# Patient Record
Sex: FEMALE | Race: WHITE | NOT HISPANIC OR LATINO | Employment: UNEMPLOYED | URBAN - NONMETROPOLITAN AREA
[De-identification: names, ages, dates, MRNs, and addresses within clinical notes are randomized per-mention and may not be internally consistent; named-entity substitution may affect disease eponyms.]

---
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
## 2024-04-02 ENCOUNTER — APPOINTMENT (OUTPATIENT)
Dept: ULTRASOUND IMAGING | Facility: HOSPITAL | Age: 19
End: 2024-04-02
Payer: COMMERCIAL

## 2024-04-02 ENCOUNTER — HOSPITAL ENCOUNTER (EMERGENCY)
Facility: HOSPITAL | Age: 19
Discharge: HOME | End: 2024-04-02
Attending: EMERGENCY MEDICINE | Admitting: EMERGENCY MEDICINE
Payer: COMMERCIAL

## 2024-04-02 VITALS
SYSTOLIC BLOOD PRESSURE: 116 MMHG | BODY MASS INDEX: 29.35 KG/M2 | HEART RATE: 88 BPM | TEMPERATURE: 98.2 F | OXYGEN SATURATION: 95 % | HEIGHT: 70 IN | WEIGHT: 205 LBS | RESPIRATION RATE: 16 BRPM | DIASTOLIC BLOOD PRESSURE: 77 MMHG

## 2024-04-02 DIAGNOSIS — M25.562 POSTERIOR LEFT KNEE PAIN: Primary | ICD-10-CM

## 2024-04-02 PROCEDURE — 99284 EMERGENCY DEPT VISIT MOD MDM: CPT

## 2024-04-02 PROCEDURE — 93971 EXTREMITY STUDY: CPT

## 2024-04-02 NOTE — ED PROVIDER NOTES
"Subjective  History of Present Illness:    Chief Complaint:   Chief Complaint   Patient presents with    Knee Pain      History of Present Illness: Moise Malone is a 18 y.o. female who presents to the emergency department complaining of left popliteal pain.  Patient states for 2 years she has been seen several times for this.  She had several plain films which were all normal per her report, she states she believes had an MRI done in the past year that showed a Baker's cyst but she is unclear for sure if it was a CT scan or an MRI.  She has been referred to orthopedics but due to insurance issues has not been able to be seen.  She states the pain in the left popliteal area is worsening feels as though her knee gets \"stuck\" and \"locks\" when her knee is flexed and she has to use her hands to straighten her leg out and it is painful when her knee is locked.  No calf pain or tenderness.  No history of DVT or PE.  Not on any oral contraceptives.  No redness warmth or swelling to the joint.  Onset: 2 years ago  Duration: Ongoing  Exacerbating / Alleviating factors: None  Associated symptoms: None      Nurses Notes reviewed and agree, including vitals, allergies, social history and prior medical history.     Review of Systems   Constitutional: Negative.    HENT: Negative.     Eyes: Negative.    Respiratory: Negative.     Cardiovascular: Negative.    Gastrointestinal: Negative.    Genitourinary: Negative.    Musculoskeletal:         Left popliteal pain   Skin: Negative.    Neurological: Negative.    Psychiatric/Behavioral: Negative.         Past Medical History:   Diagnosis Date    ADHD        Allergies:    Penicillins, Clindamycin/lincomycin, and Benzodiazepines      History reviewed. No pertinent surgical history.      Social History     Socioeconomic History    Marital status: Single         History reviewed. No pertinent family history.    Objective  Physical Exam:  /77 (BP Location: Left arm, Patient Position: " "Sitting)   Pulse 88   Temp 98.2 °F (36.8 °C) (Oral)   Resp 16   Ht 177.8 cm (70\")   Wt 93 kg (205 lb)   LMP  (LMP Unknown)   SpO2 95%   BMI 29.41 kg/m²      Physical Exam  Vitals and nursing note reviewed.   Constitutional:       General: She is not in acute distress.     Appearance: Normal appearance. She is not ill-appearing, toxic-appearing or diaphoretic.   HENT:      Head: Normocephalic and atraumatic.      Nose: Nose normal.   Eyes:      Extraocular Movements: Extraocular movements intact.   Cardiovascular:      Rate and Rhythm: Normal rate and regular rhythm.      Pulses: Normal pulses.   Pulmonary:      Effort: Pulmonary effort is normal.   Abdominal:      General: Abdomen is flat.   Musculoskeletal:         General: Normal range of motion.      Cervical back: Normal range of motion.      Comments: Tenderness to the left popliteal area.  No erythema warmth or swelling to the left knee.  No effusion, no tenderness.  2+ DP pulse on the left.   Skin:     General: Skin is warm and dry.   Neurological:      General: No focal deficit present.      Mental Status: She is alert. Mental status is at baseline.   Psychiatric:         Mood and Affect: Mood normal.           Procedures    ED Course:         Lab Results (last 24 hours)       ** No results found for the last 24 hours. **             US Venous Doppler Lower Extremity Left (duplex)    Result Date: 4/2/2024  FINAL REPORT TECHNIQUE: Multiple transverse and longitudinal images were performed of the femoral-popliteal deep venous system with augmentation and compression maneuvers. CLINICAL HISTORY: left popliteal pain FINDINGS: Left lower extremity duplex ultrasound demonstrates normal flow in the deep venous system.  There is no abnormal echogenicity to suggest thrombus.  There is normal compression and augmentation.     Impression: No evidence of DVT. Authenticated and Electronically Signed by Gerber Ibanez M.D. on 04/02/2024 06:18:08 PM                      "      Medical Decision Making            Moise Malone is a 18 y.o. female who presents to the emergency department for evaluation of left popliteal pain    Differential diagnosis includes cyst, DVT, sprain, strain among other etiologies.    ReSound of the left lower extremity ordered for further evaluation of the patient's presentation.    Chart review if available included outside testing, previous visits, prior labs, prior imaging, available notes from prior evaluations or visits with specialists, medication list, allergies, past medical history, past surgical history when applicable.    Patient was treated with Medications - No data to display    Ultrasound reveals no DVT, patient has been given information for parents application by ED , will refer to orthopedics    Plan for disposition is charged home.  Patient/family comfortable with and understanding of the plan.      Final diagnoses:   Posterior left knee pain          Domenico Urban PA-C  04/02/24 1837

## 2024-04-02 NOTE — Clinical Note
[facility] EMERGENCY DEPARTMENT  [address] [address]  Phone: [phone]    Moise Malone was seen and treated in our emergency department on 4/2/2024.  She may return to work on 04/03/2024.         Thank you for choosing [facility].    Rylan Su MD

## 2024-04-17 DIAGNOSIS — M25.562 ARTHRALGIA OF LEFT KNEE: Primary | ICD-10-CM

## 2024-04-18 ENCOUNTER — OFFICE VISIT (OUTPATIENT)
Dept: ORTHOPEDIC SURGERY | Facility: CLINIC | Age: 19
End: 2024-04-18
Payer: COMMERCIAL

## 2024-04-18 VITALS — HEIGHT: 70 IN | TEMPERATURE: 98.6 F | WEIGHT: 205 LBS | BODY MASS INDEX: 29.35 KG/M2

## 2024-04-18 DIAGNOSIS — M23.92 KNEE LOCKING, LEFT: ICD-10-CM

## 2024-04-18 DIAGNOSIS — M25.562 CHRONIC PAIN OF LEFT KNEE: Primary | ICD-10-CM

## 2024-04-18 DIAGNOSIS — G89.29 CHRONIC PAIN OF LEFT KNEE: Primary | ICD-10-CM

## 2024-04-18 RX ORDER — HYDROXYZINE HYDROCHLORIDE 25 MG/1
1 TABLET, FILM COATED ORAL 3 TIMES DAILY
COMMUNITY
Start: 2024-04-09

## 2024-04-18 RX ORDER — MONTELUKAST SODIUM 10 MG/1
TABLET ORAL
COMMUNITY
Start: 2024-04-11

## 2024-04-18 RX ORDER — LURASIDONE HYDROCHLORIDE 20 MG/1
20 TABLET, FILM COATED ORAL DAILY
COMMUNITY
Start: 2024-04-11

## 2024-04-18 RX ORDER — METHYLPREDNISOLONE ACETATE 40 MG/ML
40 INJECTION, SUSPENSION INTRA-ARTICULAR; INTRALESIONAL; INTRAMUSCULAR; SOFT TISSUE
Status: COMPLETED | OUTPATIENT
Start: 2024-04-18 | End: 2024-04-18

## 2024-04-18 RX ORDER — AZITHROMYCIN 500 MG/1
TABLET, FILM COATED ORAL
COMMUNITY
Start: 2024-04-16

## 2024-04-18 RX ORDER — PANTOPRAZOLE SODIUM 20 MG/1
1 TABLET, DELAYED RELEASE ORAL DAILY
COMMUNITY
Start: 2024-04-10

## 2024-04-18 RX ORDER — LIDOCAINE HYDROCHLORIDE 20 MG/ML
2 INJECTION, SOLUTION INFILTRATION; PERINEURAL
Status: COMPLETED | OUTPATIENT
Start: 2024-04-18 | End: 2024-04-18

## 2024-04-18 RX ADMIN — LIDOCAINE HYDROCHLORIDE 2 ML: 20 INJECTION, SOLUTION INFILTRATION; PERINEURAL at 11:48

## 2024-04-18 RX ADMIN — METHYLPREDNISOLONE ACETATE 40 MG: 40 INJECTION, SUSPENSION INTRA-ARTICULAR; INTRALESIONAL; INTRAMUSCULAR; SOFT TISSUE at 11:48

## 2024-04-18 NOTE — PROGRESS NOTES
Office Note     Name: Moise Malone    : 2005     MRN: [id number]     Chief Complaint  Pain of the Left Knee (States she has been having pain for about two years. No known injury. )    Subjective     History of Present Illness:  Moise Malone is a 19 y.o. female presenting today for evaluation of chronic left knee pain ongoing for the past 2 years.  She notes that the symptoms appear to be occurring more frequently and have been worse in the past few months.  She complains of pain throughout the knee that is exacerbated by repetitive bending or prolonged walking and standing.  She notes occasional mechanical locking of the knee.  She recalls instances where she was squatting and tried to stand up when her knee locked, gave out, and resulted in a few falls.  She notes that she had an MRI of her knee done about 6 months ago where the MRI revealed a Baker's cyst but no structural damage.  She notes that she was given a knee immobilizer which she wore for approximately 1 month.  She states the knee immobilizer did not significantly help her symptoms.  She has not tried physical therapy or had cortisone injections.  She denies any significant instability in the knee with walking.  She also notes occasional numbness and tingling distal to the left knee but usually only in certain positions.    Review of Systems   Constitutional:  Negative for fever.   HENT:  Negative for dental problem and voice change.    Eyes:  Negative for visual disturbance.   Respiratory:  Negative for shortness of breath.    Cardiovascular:  Negative for chest pain.   Gastrointestinal:  Negative for abdominal pain.   Genitourinary:  Negative for dysuria.   Musculoskeletal:  Positive for arthralgias (bilateral knee) and joint swelling. Negative for gait problem. Myalgias: bilateral knee.  Skin:  Negative for rash.   Neurological:  Negative for speech difficulty.   Hematological:  Does not bruise/bleed easily.   Psychiatric/Behavioral:   "Negative for confusion.         Past Medical History:   Diagnosis Date    ADHD         History reviewed. No pertinent surgical history.    Family History   Problem Relation Age of Onset    Diabetes Maternal Grandmother        Social History     Socioeconomic History    Marital status: Single   Tobacco Use    Smoking status: Some Days     Types: Cigarettes     Passive exposure: Never    Smokeless tobacco: Never   Vaping Use    Vaping status: Every Day    Substances: Nicotine    Devices: Disposable   Substance and Sexual Activity    Alcohol use: Yes     Comment: rarely    Drug use: Yes     Types: Marijuana         Current Outpatient Medications:     azithromycin (ZITHROMAX) 500 MG tablet, , Disp: , Rfl:     hydrOXYzine (ATARAX) 25 MG tablet, Take 1 tablet by mouth 3 times a day., Disp: , Rfl:     Lurasidone HCl (LATUDA) 20 MG tablet tablet, Take 1 tablet by mouth Daily., Disp: , Rfl:     montelukast (SINGULAIR) 10 MG tablet, , Disp: , Rfl:     pantoprazole (PROTONIX) 20 MG EC tablet, Take 1 tablet by mouth Daily., Disp: , Rfl:     Allergies   Allergen Reactions    Penicillins Anaphylaxis    Clindamycin/Lincomycin Nausea And Vomiting    Benzodiazepines Rash           Objective   Temp 98.6 °F (37 °C)   Ht 177.8 cm (70\")   Wt 93 kg (205 lb)   LMP  (LMP Unknown)   BMI 29.41 kg/m²            Physical Exam  Musculoskeletal:      Left knee: No effusion.      Instability Tests: Medial Jerilyn test negative and lateral Jerilyn test negative.       Left Knee Exam     Muscle Strength   The patient has normal left knee strength.    Tenderness   The patient is experiencing no tenderness.     Range of Motion   The patient has normal left knee ROM.    Tests   Jerilyn:  Medial - negative Lateral - negative  Varus: negative Valgus: negative  Lachman:  Anterior - negative    Posterior - negative  Drawer:  Anterior - negative     Posterior - negative  Patellar apprehension: 1+    Other   Erythema: absent  Sensation: " normal  Pulse: present  Swelling: none  Effusion: no effusion present    Comments:  Mild hyper mobility of the kneecap is noted.  Negative patellar grind test.  Jerilyn's did not reveal any click or ryan locking of the knee.  She has full range of motion of the knee with mild increase in pain at full flexion.           Extremity DVT signs are negative by clinical screen.     Independent Review of Radiographic Studies:    Three-view plain films of the left knee were done in office today including a bilateral AP weightbearing and bilateral sunrise view.  No acute osseous abnormalities are noted.  No significant degenerative changes are noted other than perhaps mild medial patellar facet joint space narrowing.    Large Joint Arthrocentesis: L knee  Date/Time: 4/18/2024 11:48 AM  Consent given by: patient  Site marked: site marked  Timeout: Immediately prior to procedure a time out was called to verify the correct patient, procedure, equipment, support staff and site/side marked as required   Supporting Documentation  Indications: pain   Procedure Details  Location: knee - L knee  Preparation: Patient was prepped and draped in the usual sterile fashion  Needle size: 22 G  Approach: anterolateral  Medications administered: 40 mg methylPREDNISolone acetate 40 MG/ML; 2 mL lidocaine 2%  Patient tolerance: patient tolerated the procedure well with no immediate complications        Assessment and Plan   Diagnoses and all orders for this visit:    1. Chronic pain of left knee (Primary)  -     Ambulatory Referral to Physical Therapy    2. Knee locking, left  -     Ambulatory Referral to Physical Therapy    Other orders  -     Large Joint Arthrocentesis: L knee       Discussion of orthopedic goals  Orthopedic activities reviewed and patient expressed appreciation  Regular exercise as tolerated  Risk, benefits, and merits of treatment alternatives reviewed with the patient and questions answered  Patient guided on mobility and  conditioning exercises  Ice, heat, and/or modalities as beneficial  Elevate leg for residual swelling  Call or notify for any adverse effect from injection therapy  Physical therapy referral given  Use brace as instructed  Counseling on diet, nutrition, fitness exercise, and weight reduction goals    Recommendations/Plan:  Exercise, medications, injections, other patient advice, and return appointment as noted.  Brace: Patella stabilizing brace.  Referral: Physical and Occupational Therapy referral.  Patient is encouraged to call or return for any issues or concerns.    Differential diagnosis:  Meniscus tear with flap causing mechanical locking, patellar subluxations, patellar maltracking, patellofemoral pain syndrome    Patient was given a cortisone injection into the left knee today for symptomatic relief.  She was also given a MacConnell brace for patellar stabilization and a course of physical therapy for quadricep strengthening.  Advised patient to follow-up with me after physical therapy if her symptoms fail to improve.  Also requested the patient to receive another copy of the MRI disc.  Patient provided an MRI disc though the MRI was unable to be viewed due to suspected damage to the disc.    Return in about 6 weeks (around 5/30/2024), or if symptoms worsen or fail to improve.  Patient agreeable to call or return sooner for any concerns.

## 2024-04-18 NOTE — LETTER
April 18, 2024     Patient: Moise Malone   YOB: 2005   Date of Visit: 4/18/2024       To Whom It May Concern:    It is my medical opinion that Moise Malone may return to light duty immediately with the following restrictions: no lifting more than 15 lbs, no repetitive squatting.           Sincerely,        Aden Wilson PA-C